# Patient Record
Sex: MALE | Race: WHITE
[De-identification: names, ages, dates, MRNs, and addresses within clinical notes are randomized per-mention and may not be internally consistent; named-entity substitution may affect disease eponyms.]

---

## 2019-06-17 ENCOUNTER — HOSPITAL ENCOUNTER (OUTPATIENT)
Dept: HOSPITAL 76 - SC | Age: 29
Discharge: HOME | End: 2019-06-17
Attending: INTERNAL MEDICINE
Payer: COMMERCIAL

## 2019-06-17 DIAGNOSIS — G47.33: Primary | ICD-10-CM

## 2019-06-17 DIAGNOSIS — E66.9: ICD-10-CM

## 2019-06-17 PROCEDURE — 99212 OFFICE O/P EST SF 10 MIN: CPT

## 2019-06-17 PROCEDURE — 99203 OFFICE O/P NEW LOW 30 MIN: CPT

## 2019-09-25 ENCOUNTER — HOSPITAL ENCOUNTER (OUTPATIENT)
Dept: HOSPITAL 76 - DI | Age: 29
Discharge: HOME | End: 2019-09-25
Attending: HEALTH CARE PROVIDER
Payer: COMMERCIAL

## 2019-09-25 DIAGNOSIS — R60.0: ICD-10-CM

## 2019-09-25 DIAGNOSIS — M79.675: Primary | ICD-10-CM

## 2019-09-26 NOTE — MRI REPORT
Reason:  PAIN IN LEFT TOE

Procedure Date:  09/25/2019   

Accession Number:  [id number] / [id number]                    

Procedure:  MRI - Foot LT W/O CPT Code:  

 

FULL RESULT:

 

 

EXAM:

LEFT MIDFOOT MRI WITHOUT CONTRAST

 

EXAM DATE: 9/25/2019 03:23 PM.

 

CLINICAL HISTORY: Pain in left toe.

 

COMPARISON: None.

 

TECHNIQUE: Multiplanar, multisequence T1-weighted and fluid-sensitive 

sequences of the midfoot without contrast. Other: None.

 

FINDINGS:

Bones and articular surfaces: There is moderate marrow edema throughout 

the second proximal phalanx. A discrete fracture line is not identified. 

Marrow signal otherwise appears normal. Flexion at the proximal 

interphalangeal joints of the second and third toes. No significant joint 

space narrowing or osteophyte formation.

 

Musculotendinous structures: Small lobulated fluid collection between the 

second flexor digitorum longus tendon and the distal aspect of the 

proximal phalanx of the second toe measuring 1.0 x 0.5 x 0.4 cm. Possible 

small ganglion or synovial cyst. Possible communication with the flexor 

tendon sheath and/or the proximal interphalangeal joint. Flexor and 

extensor tendons otherwise appear normal. No muscle atrophy or fatty 

replaced within the field of view.

IMPRESSION:

1. Moderate marrow edema throughout the second proximal phalanx. 

Nonspecific. Differential considerations include bone contusion, healing 

nondisplaced fracture or early osteomyelitis. However, no discrete 

fracture line identified and T1 fatty marrow signal is largely maintained.

2. Small ganglion or synovial cyst between the distal second proximal 

phalanx and the second longus tendon.

 

[employer]